# Patient Record
Sex: FEMALE | Race: BLACK OR AFRICAN AMERICAN | URBAN - METROPOLITAN AREA
[De-identification: names, ages, dates, MRNs, and addresses within clinical notes are randomized per-mention and may not be internally consistent; named-entity substitution may affect disease eponyms.]

---

## 2019-01-14 ENCOUNTER — OFFICE (OUTPATIENT)
Dept: URBAN - METROPOLITAN AREA CLINIC 2 | Facility: CLINIC | Age: 24
End: 2019-01-14

## 2019-01-14 VITALS
HEIGHT: 65 IN | DIASTOLIC BLOOD PRESSURE: 72 MMHG | WEIGHT: 220 LBS | HEART RATE: 76 BPM | SYSTOLIC BLOOD PRESSURE: 124 MMHG

## 2019-01-14 DIAGNOSIS — R19.7 DIARRHEA, UNSPECIFIED: ICD-10-CM

## 2019-01-14 DIAGNOSIS — R19.4 CHANGE IN BOWEL HABIT: ICD-10-CM

## 2019-01-14 DIAGNOSIS — R10.31 RIGHT LOWER QUADRANT PAIN: ICD-10-CM

## 2019-01-14 DIAGNOSIS — R10.32 LEFT LOWER QUADRANT PAIN: ICD-10-CM

## 2019-01-14 PROCEDURE — 99204 OFFICE O/P NEW MOD 45 MIN: CPT | Performed by: INTERNAL MEDICINE

## 2019-01-14 RX ORDER — DICYCLOMINE HYDROCHLORIDE 20 MG/1
TABLET ORAL
Qty: 90 | Refills: 2 | Status: ACTIVE
Start: 2019-01-14

## 2019-01-14 NOTE — SERVICEHPINOTES
Thank you very much for referring Ms. Metz for evaluation. As you know she is a pleasant 23-year-old  female who in the past had issues with constipation. She's had constipation for years but over the past 4-6 weeks she's had a change in bowel habits. She'll not have postprandial sharp lower abdominal pain urgency and have to go to the bathroom every time she eats. She's been having bowel movements 3-4 times a day and she says she'll even get up at night to go. There is no weight loss, there is no fevers or chills. It doesn't matter what she eats. There's been no blood in the bowels. There is no travel or well water use. She's had a previous cholecystectomy but that was years ago so there is no correlation with symptoms. She is not on any medications. She had a colonoscopy in 2014. There is no family history of polyps, colitis or colon cancer. Her hemoglobin is 12.2. Her hepatic panel is normal.She has no chronic upper GI complaints. She's not complaining of reflux. There is no dysphagia, odynophagia nausea or vomiting. There is no melena or hematemesis. She does not smoke or drink. She is in no distress. She does not look acutely ill.

## 2019-01-14 NOTE — SERVICENOTES
records reviewed.  Hemoglobin 12.2, hematocrit 41.4.  Remainder of CBC within normal limits.  CMP within normal limits.  A1c 5.3.  HDL 37.  .